# Patient Record
Sex: MALE | Race: BLACK OR AFRICAN AMERICAN | NOT HISPANIC OR LATINO | Employment: FULL TIME | URBAN - METROPOLITAN AREA
[De-identification: names, ages, dates, MRNs, and addresses within clinical notes are randomized per-mention and may not be internally consistent; named-entity substitution may affect disease eponyms.]

---

## 2018-01-24 ENCOUNTER — OFFICE VISIT (OUTPATIENT)
Dept: FAMILY MEDICINE | Facility: CLINIC | Age: 48
End: 2018-01-24
Payer: COMMERCIAL

## 2018-01-24 ENCOUNTER — LAB VISIT (OUTPATIENT)
Dept: LAB | Facility: HOSPITAL | Age: 48
End: 2018-01-24
Attending: INTERNAL MEDICINE
Payer: COMMERCIAL

## 2018-01-24 VITALS
SYSTOLIC BLOOD PRESSURE: 130 MMHG | HEART RATE: 82 BPM | HEIGHT: 71 IN | DIASTOLIC BLOOD PRESSURE: 80 MMHG | WEIGHT: 214.94 LBS | TEMPERATURE: 98 F | OXYGEN SATURATION: 98 % | BODY MASS INDEX: 30.09 KG/M2

## 2018-01-24 DIAGNOSIS — E78.5 DYSLIPIDEMIA: ICD-10-CM

## 2018-01-24 LAB
ALBUMIN SERPL BCP-MCNC: 3.6 G/DL
ALP SERPL-CCNC: 91 U/L
ALT SERPL W/O P-5'-P-CCNC: 14 U/L
ANION GAP SERPL CALC-SCNC: 6 MMOL/L
AST SERPL-CCNC: 11 U/L
BILIRUB SERPL-MCNC: 1.3 MG/DL
BUN SERPL-MCNC: 15 MG/DL
CALCIUM SERPL-MCNC: 9.5 MG/DL
CHLORIDE SERPL-SCNC: 98 MMOL/L
CHOLEST SERPL-MCNC: 189 MG/DL
CHOLEST/HDLC SERPL: 4.1 {RATIO}
CO2 SERPL-SCNC: 30 MMOL/L
CREAT SERPL-MCNC: 1.1 MG/DL
EST. GFR  (AFRICAN AMERICAN): >60 ML/MIN/1.73 M^2
EST. GFR  (NON AFRICAN AMERICAN): >60 ML/MIN/1.73 M^2
ESTIMATED AVG GLUCOSE: 326 MG/DL
GLUCOSE SERPL-MCNC: 320 MG/DL
HBA1C MFR BLD HPLC: 13 %
HDLC SERPL-MCNC: 46 MG/DL
HDLC SERPL: 24.3 %
LDLC SERPL CALC-MCNC: 130.4 MG/DL
NONHDLC SERPL-MCNC: 143 MG/DL
POTASSIUM SERPL-SCNC: 4.3 MMOL/L
PROT SERPL-MCNC: 7.9 G/DL
SODIUM SERPL-SCNC: 134 MMOL/L
TRIGL SERPL-MCNC: 63 MG/DL
TSH SERPL DL<=0.005 MIU/L-ACNC: 0.75 UIU/ML

## 2018-01-24 PROCEDURE — 99999 PR PBB SHADOW E&M-EST. PATIENT-LVL IV: CPT | Mod: PBBFAC,,, | Performed by: INTERNAL MEDICINE

## 2018-01-24 PROCEDURE — 80061 LIPID PANEL: CPT

## 2018-01-24 PROCEDURE — 99214 OFFICE O/P EST MOD 30 MIN: CPT | Mod: S$GLB,,, | Performed by: INTERNAL MEDICINE

## 2018-01-24 PROCEDURE — 36415 COLL VENOUS BLD VENIPUNCTURE: CPT | Mod: PO

## 2018-01-24 PROCEDURE — 83036 HEMOGLOBIN GLYCOSYLATED A1C: CPT

## 2018-01-24 PROCEDURE — 80053 COMPREHEN METABOLIC PANEL: CPT

## 2018-01-24 PROCEDURE — 84443 ASSAY THYROID STIM HORMONE: CPT

## 2018-01-24 RX ORDER — ATORVASTATIN CALCIUM 20 MG/1
20 TABLET, FILM COATED ORAL DAILY
Qty: 90 TABLET | Refills: 3 | Status: SHIPPED | OUTPATIENT
Start: 2018-01-24 | End: 2019-01-24

## 2018-01-24 RX ORDER — GLIMEPIRIDE 4 MG/1
4 TABLET ORAL 2 TIMES DAILY
Qty: 180 TABLET | Refills: 3 | Status: SHIPPED | OUTPATIENT
Start: 2018-01-24

## 2018-01-24 RX ORDER — INSULIN GLARGINE 100 [IU]/ML
50 INJECTION, SOLUTION SUBCUTANEOUS DAILY
Qty: 3 ML | Refills: 11 | Status: SHIPPED | OUTPATIENT
Start: 2018-01-24 | End: 2019-01-24

## 2018-01-24 RX ORDER — INSULIN LISPRO 100 [IU]/ML
14 INJECTION, SOLUTION INTRAVENOUS; SUBCUTANEOUS
Qty: 3 ML | Refills: 11 | Status: SHIPPED | OUTPATIENT
Start: 2018-01-24 | End: 2018-01-31 | Stop reason: SDUPTHER

## 2018-01-24 NOTE — PATIENT INSTRUCTIONS
I AM TRYING YOU ON HUMALOG INSTEAD OF NOVOLOG BECAUSE I THINK IT WILL BE CHEAPER.    I AM STARTING YOU ON BASAGLAR - THIS IS LONG ACTING INSULIN.    I SENT BOTH OF THESE IN TO THE [facility] SO THEY CAN GET APPROVAL.  IN THE FUTURE I CAN SEND THIS IN TO THE [facility].    I REFILLED THE AMARYL - KEEP TAKING THAT.    PLEASE WRITE DOWN ALL YOUR SUGARS IN A BOOK AND BRING IT WITH YOU TO EVERY APPOINTMENT.    TAKE THE ATORVASTATIN FOR CHOLESTEROL.

## 2018-01-24 NOTE — PROGRESS NOTES
This note was created by combination of typed  and Dragon dictation.  Transcription errors may be present.  If there are any questions, please contact me.    Assessment & Plan  Uncontrolled type 2 diabetes mellitus without complication, with long-term current use of insulin - with a history of poor compliance.  Notes the insulin can be expensive.  I'll try him with Humalog instead of NovoLog as I think his formulary prefers Humalog; I'll try him with Basaglar instead of Toujeo.  Encouraged him to check his sugars regularly and bring in log of his readings to every visit regardless of the purpose of the visit.  Foot exam normal today.  Notes that he is due for an eye exam, does not recall the name of his eye doctor and I have asked him to have his eye doctor send me a copy of his report when he gets it done.  I've asked him to bring in his pen to work and administer short acting insulin with every meal.  An I've asked him to restart long-acting insulin.  I've refilled his glimepiride 4 mg twice daily.  Fasting today, check lipid profile, metabolic panel, A1c.  I expect the A1c to be high.  I sent in the insulin to [facility] for prior authorization approval.  -     insulin lispro (HUMALOG) 100 unit/mL injection; Inject 14 Units into the skin 3 (three) times daily before meals.  Dispense: 3 mL; Refill: 11  -     insulin glargine (BASAGLAR KWIKPEN) 100 unit/mL (3 mL) InPn pen; Inject 50 Units into the skin once daily.  Dispense: 3 mL; Refill: 11  -     glimepiride (AMARYL) 4 MG tablet; Take 1 tablet (4 mg total) by mouth 2 (two) times daily.  Dispense: 180 tablet; Refill: 3  -     Comprehensive metabolic panel; Future; Expected date: 01/24/2018  -     Lipid panel; Future; Expected date: 01/24/2018  -     Hemoglobin A1c; Future; Expected date: 01/24/2018  -     TSH; Future; Expected date: 01/24/2018    Dyslipidemia-looks like previously on atorvastatin though he can't recall this medication.  I  have sent the medication in and instructed him to take it regularly.  -     atorvastatin (LIPITOR) 20 MG tablet; Take 1 tablet (20 mg total) by mouth once daily.  Dispense: 90 tablet; Refill: 3    Erectile disorder-address next visit    Medications Discontinued During This Encounter   Medication Reason    insulin aspart (NOVOLOG) 100 unit/mL Crtg Cost of medication    insulin glargine, TOUJEO, (TOUJEO SOLOSTAR) 300 unit/mL (1.5 mL) InPn pen Cost of medication    glimepiride (AMARYL) 4 MG tablet Reorder       Follow-up: No Follow-up on file. follow-up 1 month, I've asked him to bring in his blood sugar readings with him at that visit.      =================================================================      Chief Complaint   Patient presents with    hospitals Care    Diabetes       GUERO  Teresa is a 48 y.o. male, last appointment with this clinic was Visit date not found.    Former pt of Dr. Dupont.  Originally scheduled for 8:20 appt, showed up at 10 AM.  I had another opening and was able to accomodate him.  First visit with me.    Diabetes type 2, on insulin; hx of liraglutide.  Hx of poor compliance.    Hyperlipidemia hx of atorvastatin though he has no recollection of such.   Microcytosis.  Normal RDW.  Normal white count and platelet count.  Had previously complained of flank pain and the plan was to order ultrasound, does not appear this was ever done.     Last labs on record November 2016, A1c at that time 10.5, LDL at that time 123    He is here accompanied by his wife.  On review of his medication list he is only taking the NovoLog and only taking it once a day.  Does note that it is expensive.  He takes 14 units just once a day.  He was not aware that he could bring the pen unrefrigerated to work and administer with meals.  I've asked him to try to do so.  In regards to the long-acting insulin he is not taking that.  Does not check his sugars every day.  Did not bring in any glucose readings.  Did  not check his sugars today but recalls yesterday fasting sugar was 273.  He was previously prescribed NovoLog and Toujeo.  I believe on his insurance formulary, Basaglar would be less expensive and that it prefers humalog to novolog.  So will try that.   Eye doctor on [facility].   No self foot exam.  No chest pain no dyspnea. No tingling in the extremities.  Notes issues with erectile dysfunction.    Patient Care Team:  Simba Riojas MD as PCP - General (Internal Medicine)    Patient Active Problem List    Diagnosis Date Noted    Type 2 diabetes mellitus, uncontrolled 01/13/2016    BMI 32.0-32.9,adult 01/13/2016    Dyslipidemia 07/29/2015    Obesity 12/26/2012       PAST MEDICAL HISTORY:  Past Medical History:   Diagnosis Date    GERD (gastroesophageal reflux disease)     Insomnia     Palpitations     Type II or unspecified type diabetes mellitus without mention of complication, not stated as uncontrolled        PAST SURGICAL HISTORY:  History reviewed. No pertinent surgical history.    SOCIAL HISTORY:  Social History     Social History    Marital status:      Spouse name: N/A    Number of children: N/A    Years of education: N/A     Occupational History    floor care, day shift [employer]     Social History Main Topics    Smoking status: Never Smoker    Smokeless tobacco: Never Used    Alcohol use No    Drug use: No    Sexual activity: Yes     Partners: Female     Other Topics Concern    Not on file     Social History Narrative    No narrative on file       ALLERGIES AND MEDICATIONS: updated and reviewed.  Review of patient's allergies indicates:  No Known Allergies  Current Outpatient Prescriptions   Medication Sig Dispense Refill    blood sugar diagnostic (FREESTYLE LITE STRIPS) Strp 1 each by Misc.(Non-Drug; Combo Route) route 3 (three) times daily. 100 each 5    glimepiride (AMARYL) 4 MG tablet Take 1 tablet (4 mg total) by mouth 2 (two) times daily. 180 tablet 3    insulin  "aspart (NOVOLOG) 100 unit/mL Crtg Inject 15 Units into the skin 3 (three) times daily with meals. and dinner 5 cartridge 6    insulin glargine, TOUJEO, (TOUJEO SOLOSTAR) 300 unit/mL (1.5 mL) InPn pen Inject 50 Units into the skin every evening. 5 Syringe 6     No current facility-administered medications for this visit.        Review of Systems   Constitutional: Negative for chills, fever, malaise/fatigue and weight loss.   HENT: Negative for congestion.    Eyes: Negative for blurred vision and pain.   Respiratory: Negative for shortness of breath and wheezing.    Cardiovascular: Negative for chest pain, palpitations and leg swelling.   Gastrointestinal: Negative for abdominal pain.   Genitourinary: Negative for dysuria.   Neurological: Negative for tingling, focal weakness, weakness and headaches.       Physical Exam  Vitals:    01/24/18 1011 01/24/18 1037   BP: (!) 144/90 130/80   BP Location:  Left arm   Patient Position:  Sitting   BP Method:  Large (Manual)   Pulse: 82    Temp: 98.2 °F (36.8 °C)    SpO2: 98%    Weight: 97.5 kg (214 lb 15.2 oz)    Height: 5' 11" (1.803 m)     Body mass index is 29.98 kg/m².  Weight: 97.5 kg (214 lb 15.2 oz)   Height: 5' 11" (180.3 cm)     Physical Exam   Constitutional: He is oriented to person, place, and time. He appears well-developed and well-nourished. No distress.   Eyes: EOM are normal.   Cardiovascular: Normal rate, regular rhythm and normal heart sounds.    No murmur heard.  Pulses:       Dorsalis pedis pulses are 3+ on the right side, and 0 on the left side.        Posterior tibial pulses are 0 on the right side, and 3+ on the left side.   Pulmonary/Chest: Effort normal and breath sounds normal.   Musculoskeletal: Normal range of motion.        Right foot: There is no deformity.        Left foot: There is no deformity.   Feet:   Right Foot:   Protective Sensation: 5 sites tested. 5 sites sensed.   Skin Integrity: Negative for ulcer, blister, skin breakdown, erythema " or warmth.   Left Foot:   Protective Sensation: 5 sites tested. 5 sites sensed.   Skin Integrity: Negative for ulcer, blister, skin breakdown, erythema or warmth.   Neurological: He is alert and oriented to person, place, and time. Coordination normal.   Skin: Skin is warm and dry.   Psychiatric: He has a normal mood and affect. His behavior is normal. Thought content normal.

## 2018-01-25 NOTE — PROGRESS NOTES
a1c high expected.  Was not taking insulin as prescribed.  Discussed with him at OV.  Changed the insulin rx.  Lipid high not on statin at time of visit.    TSH WNL  Na low and creatinine above baseline.  Suspect due to hyperglycemia at time of labs, will need to monitor with (hopefully) better glucose control.

## 2018-01-26 DIAGNOSIS — Z13.5 DIABETIC RETINOPATHY SCREENING: ICD-10-CM

## 2018-01-31 ENCOUNTER — TELEPHONE (OUTPATIENT)
Dept: FAMILY MEDICINE | Facility: CLINIC | Age: 48
End: 2018-01-31

## 2018-01-31 RX ORDER — INSULIN LISPRO 100 [IU]/ML
14 INJECTION, SOLUTION INTRAVENOUS; SUBCUTANEOUS
Qty: 3 ML | Refills: 11 | Status: SHIPPED | OUTPATIENT
Start: 2018-01-31 | End: 2019-01-31

## 2018-01-31 NOTE — TELEPHONE ENCOUNTER
----- Message from Mary Escobar sent at 1/26/2018 12:41 PM CST -----  Contact: Self/[phone]  Patient would like to speak the staff about his prescription:  insulin lispro (HUMALOG) 100 unit/mL injection. Thank you.

## 2021-01-14 ENCOUNTER — CLINICAL SUPPORT (OUTPATIENT)
Dept: URGENT CARE | Facility: CLINIC | Age: 51
End: 2021-01-14
Payer: COMMERCIAL

## 2021-01-14 DIAGNOSIS — U07.1 COVID-19: Primary | ICD-10-CM

## 2021-01-14 DIAGNOSIS — U07.1 COVID-19 VIRUS DETECTED: ICD-10-CM

## 2021-01-14 LAB
CTP QC/QA: YES
SARS-COV-2 RDRP RESP QL NAA+PROBE: POSITIVE

## 2021-01-14 PROCEDURE — U0002 COVID-19 LAB TEST NON-CDC: HCPCS | Mod: QW,S$GLB,, | Performed by: PHYSICIAN ASSISTANT

## 2021-01-14 PROCEDURE — U0002: ICD-10-PCS | Mod: QW,S$GLB,, | Performed by: PHYSICIAN ASSISTANT

## 2022-09-22 ENCOUNTER — HOSPITAL ENCOUNTER (EMERGENCY)
Facility: HOSPITAL | Age: 52
Discharge: HOME | End: 2022-09-22
Attending: EMERGENCY MEDICINE
Payer: COMMERCIAL

## 2022-09-22 VITALS
WEIGHT: 216 LBS | BODY MASS INDEX: 30.24 KG/M2 | RESPIRATION RATE: 16 BRPM | HEIGHT: 71 IN | DIASTOLIC BLOOD PRESSURE: 75 MMHG | OXYGEN SATURATION: 99 % | HEART RATE: 72 BPM | TEMPERATURE: 98 F | SYSTOLIC BLOOD PRESSURE: 131 MMHG

## 2022-09-22 DIAGNOSIS — M54.32 LEFT SIDED SCIATICA: Primary | ICD-10-CM

## 2022-09-22 LAB — POCT GLUCOSE: 170 MG/DL (ref 70–110)

## 2022-09-22 PROCEDURE — 82962 GLUCOSE BLOOD TEST: CPT | Mod: ER

## 2022-09-22 PROCEDURE — 99284 EMERGENCY DEPT VISIT MOD MDM: CPT | Mod: 25,ER

## 2022-09-22 PROCEDURE — 96372 THER/PROPH/DIAG INJ SC/IM: CPT | Performed by: NURSE PRACTITIONER

## 2022-09-22 PROCEDURE — 63600175 PHARM REV CODE 636 W HCPCS: Mod: ER | Performed by: NURSE PRACTITIONER

## 2022-09-22 RX ORDER — SULINDAC 150 MG/1
150 TABLET ORAL 2 TIMES DAILY
Qty: 10 TABLET | Refills: 0 | Status: SHIPPED | OUTPATIENT
Start: 2022-09-22 | End: 2022-09-27

## 2022-09-22 RX ORDER — CYCLOBENZAPRINE HCL 10 MG
10 TABLET ORAL 3 TIMES DAILY PRN
Qty: 15 TABLET | Refills: 0 | Status: SHIPPED | OUTPATIENT
Start: 2022-09-22 | End: 2022-09-27

## 2022-09-22 RX ORDER — KETOROLAC TROMETHAMINE 30 MG/ML
15 INJECTION, SOLUTION INTRAMUSCULAR; INTRAVENOUS
Status: COMPLETED | OUTPATIENT
Start: 2022-09-22 | End: 2022-09-22

## 2022-09-22 RX ADMIN — KETOROLAC TROMETHAMINE 15 MG: 30 INJECTION, SOLUTION INTRAMUSCULAR; INTRAVENOUS at 07:09

## 2022-09-22 NOTE — Clinical Note
"Teresa Guallpa" Giuseppe was seen and treated in our emergency department on 9/22/2022.  He may return to work on 09/24/2022.       If you have any questions or concerns, please don't hesitate to call.       RN    "

## 2022-09-22 NOTE — Clinical Note
"Teresa Machadofreddy Chin was seen and treated in our emergency department on 9/22/2022.  He may return to work on 09/26/2022.       If you have any questions or concerns, please don't hesitate to call.      Edil Bueno, CHRISTOPHER"
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
## 2022-09-23 NOTE — ED PROVIDER NOTES
Encounter Date: 9/22/2022    SCRIBE #1 NOTE: I, Miryam Crawford, am scribing for, and in the presence of,  Edil Bueno DNP. I have scribed the following portions of the note - Other sections scribed: HPI, ROS, PE.     History     Chief Complaint   Patient presents with    Back Pain     Pt has lower back pain that radiates down his left leg      52 y.o. male with Hx of DM who presents to the ER for acute left lower back pain radiating down his left leg since mowing the lawn yesterday. He has taken Aleeve with some alleviation of symptoms. Denies any fall or injury. Denies numbness or tingling. Patient reports seeing a chiropractor.     The history is provided by the patient. No  was used.   Review of patient's allergies indicates:  No Known Allergies  Past Medical History:   Diagnosis Date    GERD (gastroesophageal reflux disease)     Insomnia     Palpitations     Type II or unspecified type diabetes mellitus without mention of complication, not stated as uncontrolled      No past surgical history on file.  Family History   Problem Relation Age of Onset    No Known Problems Mother     No Known Problems Sister     No Known Problems Brother     No Known Problems Son     No Known Problems Daughter      Social History     Tobacco Use    Smoking status: Never    Smokeless tobacco: Never   Substance Use Topics    Alcohol use: No    Drug use: No     Review of Systems   Constitutional:  Negative for chills, fatigue and fever.   HENT:  Negative for congestion, ear discharge, ear pain, postnasal drip, rhinorrhea, sinus pressure, sneezing, sore throat and voice change.    Eyes:  Negative for discharge and itching.   Respiratory:  Negative for cough, shortness of breath and wheezing.    Cardiovascular:  Negative for chest pain, palpitations and leg swelling.   Gastrointestinal:  Negative for abdominal pain, constipation, diarrhea, nausea and vomiting.   Endocrine: Negative for polydipsia, polyphagia and  polyuria.   Genitourinary:  Negative for dysuria, frequency, hematuria and urgency.   Musculoskeletal:  Positive for back pain and myalgias (L leg). Negative for arthralgias.   Skin:  Negative for rash and wound.   Neurological:  Negative for dizziness, seizures, syncope, weakness and numbness.        (-) Tingling.   Hematological:  Negative for adenopathy. Does not bruise/bleed easily.   Psychiatric/Behavioral:  Negative for self-injury and suicidal ideas. The patient is not nervous/anxious.      Physical Exam     Initial Vitals [09/22/22 1804]   BP Pulse Resp Temp SpO2   (!) 150/80 80 18 98.7 °F (37.1 °C) 97 %      MAP       --         Physical Exam    Nursing note and vitals reviewed.  Constitutional: He appears well-developed and well-nourished. He is not diaphoretic. No distress.   HENT:   Head: Normocephalic and atraumatic.   Right Ear: External ear normal.   Left Ear: External ear normal.   Nose: Nose normal.   Eyes: Pupils are equal, round, and reactive to light. Right eye exhibits no discharge. Left eye exhibits no discharge. No scleral icterus.   Neck:   Normal range of motion.  Pulmonary/Chest: No respiratory distress.   Abdominal: He exhibits no distension.   Musculoskeletal:      Cervical back: Normal range of motion.      Lumbar back: Positive left straight leg raise test.      Comments: No spinal tenderness or step-offs.      Neurological: He is alert and oriented to person, place, and time.   Skin: Skin is dry. Capillary refill takes less than 2 seconds.       ED Course   Procedures  Labs Reviewed   POCT GLUCOSE - Abnormal; Notable for the following components:       Result Value    POCT Glucose 170 (*)     All other components within normal limits   POCT GLUCOSE MONITORING CONTINUOUS          Imaging Results    None          Medications   ketorolac injection 15 mg (15 mg Intramuscular Given 9/22/22 1956)     Medical Decision Making:   History:   Old Medical Records: I decided to obtain old medical  records.  Initial Assessment:   52 y.o. male with Hx of DM who presents to the ER for acute left lower back pain radiating down his left leg since mowing the lawn yesterday. Physical exam with positive left SLR.   Differential Diagnosis:   Includes but is not limited to: Sciatica, Strain, Muscle spasm        Scribe Attestation:   Scribe #1: I performed the above scribed service and the documentation accurately describes the services I performed. I attest to the accuracy of the note.        ED Course as of 09/22/22 2144   Thu Sep 22, 2022   1930 BP(!): 150/80 [VC]   1930 Temp: 98.7 °F (37.1 °C) [VC]   1930 Pulse: 80 [VC]   1930 Resp: 18 [VC]   1930 SpO2: 97 % [VC]   2002 POCT Glucose(!): 170 [VC]      ED Course User Index  [VC] Edil Bueno DNP               Scribmichael attestation: Scribe attestation: I, Edil Bueno DNP ACNP-BC FNP-C ENP-C,  personally performed the services described in this documentation. All medical record entries made by the scribe were at my direction and in my presence.  I have reviewed the chart and agree that the record reflects my personal performance and is accurate and complete.   Clinical Impression:   Final diagnoses:  [M54.32] Left sided sciatica (Primary)      ED Disposition Condition    Discharge Stable        See AVS for additional recommendations. Medications listed herein were prescribed after reviewing the patient's allergies, medication list, history, most recent laboratories as available.  Referrals below were provided after reviewing the patient's previous medical providers. He understands he  should return for any worsening or changes in condition.  Prior to discharge the patient was asked if he  had any additional concerns or complaints and he declined. The patient was given an opportunity to ask questions and all were answered to his satisfaction.  ED Prescriptions       Medication Sig Dispense Start Date End Date Auth. Provider    sulindac (CLINORIL) 150 MG  tablet Take 1 tablet (150 mg total) by mouth 2 (two) times daily. for 5 days 10 tablet 9/22/2022 9/27/2022 Edil Bueno DNP    cyclobenzaprine (FLEXERIL) 10 MG tablet Take 1 tablet (10 mg total) by mouth 3 (three) times daily as needed for Muscle spasms. 15 tablet 9/22/2022 9/27/2022 Edil Bueno DNP          Follow-up Information       Follow up With Specialties Details Why Contact Info    Simba Riojas MD Internal Medicine Schedule an appointment as soon as possible for a visit   [address] [facility]  [place] [id number]  [phone]               Edil Bueno DNP  09/22/22 3680

## 2022-09-24 ENCOUNTER — HOSPITAL ENCOUNTER (EMERGENCY)
Facility: HOSPITAL | Age: 52
Discharge: HOME | End: 2022-09-24
Attending: EMERGENCY MEDICINE
Payer: COMMERCIAL

## 2022-09-24 VITALS
BODY MASS INDEX: 30.1 KG/M2 | WEIGHT: 215 LBS | HEIGHT: 71 IN | OXYGEN SATURATION: 99 % | TEMPERATURE: 98 F | RESPIRATION RATE: 18 BRPM | HEART RATE: 78 BPM | SYSTOLIC BLOOD PRESSURE: 138 MMHG | DIASTOLIC BLOOD PRESSURE: 77 MMHG

## 2022-09-24 DIAGNOSIS — M54.42 ACUTE MIDLINE LOW BACK PAIN WITH LEFT-SIDED SCIATICA: Primary | ICD-10-CM

## 2022-09-24 PROCEDURE — 63600175 PHARM REV CODE 636 W HCPCS

## 2022-09-24 PROCEDURE — 99284 EMERGENCY DEPT VISIT MOD MDM: CPT

## 2022-09-24 PROCEDURE — 96372 THER/PROPH/DIAG INJ SC/IM: CPT

## 2022-09-24 PROCEDURE — 25000003 PHARM REV CODE 250

## 2022-09-24 RX ORDER — LIDOCAINE 50 MG/G
1 PATCH TOPICAL DAILY
Qty: 15 PATCH | Refills: 0 | Status: SHIPPED | OUTPATIENT
Start: 2022-09-24

## 2022-09-24 RX ORDER — KETOROLAC TROMETHAMINE 30 MG/ML
30 INJECTION, SOLUTION INTRAMUSCULAR; INTRAVENOUS
Status: COMPLETED | OUTPATIENT
Start: 2022-09-24 | End: 2022-09-24

## 2022-09-24 RX ORDER — LIDOCAINE 50 MG/G
1 PATCH TOPICAL
Status: DISCONTINUED | OUTPATIENT
Start: 2022-09-24 | End: 2022-09-24 | Stop reason: HOSPADM

## 2022-09-24 RX ORDER — ORPHENADRINE CITRATE 100 MG/1
100 TABLET, EXTENDED RELEASE ORAL 2 TIMES DAILY PRN
Qty: 20 TABLET | Refills: 0 | Status: SHIPPED | OUTPATIENT
Start: 2022-09-24 | End: 2022-10-04

## 2022-09-24 RX ORDER — ORPHENADRINE CITRATE 30 MG/ML
30 INJECTION INTRAMUSCULAR; INTRAVENOUS
Status: COMPLETED | OUTPATIENT
Start: 2022-09-24 | End: 2022-09-24

## 2022-09-24 RX ORDER — HYDROCODONE BITARTRATE AND ACETAMINOPHEN 5; 325 MG/1; MG/1
1 TABLET ORAL EVERY 4 HOURS PRN
Qty: 9 TABLET | Refills: 0 | Status: SHIPPED | OUTPATIENT
Start: 2022-09-24

## 2022-09-24 RX ADMIN — KETOROLAC TROMETHAMINE 30 MG: 30 INJECTION, SOLUTION INTRAMUSCULAR at 03:09

## 2022-09-24 RX ADMIN — LIDOCAINE 1 PATCH: 50 PATCH TOPICAL at 03:09

## 2022-09-24 RX ADMIN — ORPHENADRINE CITRATE 30 MG: 30 INJECTION INTRAMUSCULAR; INTRAVENOUS at 03:09

## 2022-09-24 NOTE — ED PROVIDER NOTES
"Encounter Date: 9/24/2022    SCRIBE #1 NOTE: I, SAKINA GEORGE, am scribing for, and in the presence of,  Alayna Holdsworth, PA-C. I have scribed the following portions of the note - Other sections scribed: HPI, ROS.     History     Chief Complaint   Patient presents with    Back Pain     Pt reports to the ED with C/O lumbar back pain x4 days. Pt reports "I was seen for this on Thursday and given a shot but it still hurts." Pt denies any changes to bowel or bladder function. Pt awaiting QT bed for eval.      52-year-old male patient with a past medical history of DM Type 2, presents to the ED with a chief complaint of lower back pain for three days. Patient reports that he was previously seen at this ED facility on September 22, 2022 for the same complaint, and was administered an injection and prescribed pain medications and muscle relaxants with no resolution. Patient states that he is still having intermittent, sharp "ten out of ten" lower back pain that radiates down his left leg and exacerbates with movement. He denies taking any other medications to alleviate pain, and further denies any recent traumas to the area. No other exacerbating or alleviating factors. Denies fever, diaphoresis, chills, SOB, CP, nausea, emesis, urinary abnormalities, neck pain, rashes, numbness, tingling, rhinorrhea, congestion, abdominal pain, diarrhea, constipation, headaches, or other associated symptoms. No IVDU.     The history is provided by the patient. No  was used.   Review of patient's allergies indicates:  No Known Allergies  Past Medical History:   Diagnosis Date    GERD (gastroesophageal reflux disease)     Insomnia     Palpitations     Type II or unspecified type diabetes mellitus without mention of complication, not stated as uncontrolled      No past surgical history on file.  Family History   Problem Relation Age of Onset    No Known Problems Mother     No Known Problems Sister     No Known Problems " Brother     No Known Problems Son     No Known Problems Daughter      Social History     Tobacco Use    Smoking status: Never    Smokeless tobacco: Never   Substance Use Topics    Alcohol use: No    Drug use: No     Review of Systems   Constitutional:  Negative for appetite change, chills, diaphoresis, fatigue and fever.   HENT:  Negative for congestion, ear discharge, ear pain, postnasal drip, rhinorrhea, sinus pressure, sneezing, sore throat and voice change.    Eyes:  Negative for discharge, itching and visual disturbance.   Respiratory:  Negative for cough, shortness of breath and wheezing.    Cardiovascular:  Negative for chest pain, palpitations and leg swelling.   Gastrointestinal:  Negative for abdominal pain, diarrhea, nausea and vomiting.   Endocrine: Negative for polydipsia, polyphagia and polyuria.   Genitourinary:  Negative for difficulty urinating, dysuria, frequency, hematuria, penile discharge, penile pain, penile swelling and urgency.   Musculoskeletal:  Positive for back pain (lower). Negative for arthralgias and myalgias.   Skin:  Negative for rash and wound.   Neurological:  Negative for dizziness, seizures, syncope, weakness, numbness and headaches.   Hematological:  Negative for adenopathy. Does not bruise/bleed easily.   Psychiatric/Behavioral:  Negative for agitation and self-injury. The patient is not nervous/anxious.      Physical Exam     Initial Vitals [09/24/22 1427]   BP Pulse Resp Temp SpO2   (!) 142/85 89 18 98.6 °F (37 °C) 100 %      MAP       --         Physical Exam    Nursing note and vitals reviewed.  Constitutional: Vital signs are normal. He appears well-developed and well-nourished. He is not diaphoretic. He is active. He does not appear ill. No distress.   HENT:   Head: Normocephalic and atraumatic.   Right Ear: External ear normal.   Left Ear: External ear normal.   Nose: Nose normal.   Eyes: Conjunctivae and lids are normal. Pupils are equal, round, and reactive to light.  Right eye exhibits no discharge. Left eye exhibits no discharge. No scleral icterus.   Neck: Neck supple.   Normal range of motion.   Full passive range of motion without pain.     Cardiovascular:  Normal rate and regular rhythm.           Pulmonary/Chest: Effort normal and breath sounds normal. No respiratory distress.   Abdominal: He exhibits no distension.   Musculoskeletal:      Cervical back: Normal, full passive range of motion without pain, normal range of motion and neck supple. No spinous process tenderness.      Thoracic back: Normal.      Lumbar back: Tenderness (Paraspinal tenderness) present. No swelling, edema, deformity, lacerations or bony tenderness. Positive left straight leg raise test.     Neurological: He is alert and oriented to person, place, and time. He has normal strength. No sensory deficit. GCS eye subscore is 4. GCS verbal subscore is 5. GCS motor subscore is 6.   Skin: Skin is dry. Capillary refill takes less than 2 seconds.       ED Course   Procedures  Labs Reviewed - No data to display       Imaging Results    None          Medications   LIDOcaine 5 % patch 1 patch (1 patch Transdermal Patch Applied 9/24/22 1536)   orphenadrine injection 30 mg (30 mg Intramuscular Given 9/24/22 1535)   ketorolac injection 30 mg (30 mg Intramuscular Given 9/24/22 1534)     Medical Decision Making:   History:   Old Medical Records: I decided to obtain old medical records.  Initial Assessment:   52-year-old male patient with a past medical history of DM Type 2, presents to the ED with a chief complaint of lower back pain.  Patient's chart and medical history reviewed.  Differential Diagnosis:   Lumbar strain  Lumbar fracture  Herniated disc  Cauda Equina  Spinal Abscess  Sciatica   ED Management:  Patient's vitals reviewed.  He is afebrile, no respiratory distress, nontoxic-appearing in the ED. Patient had lower paraspinal tenderness with a positive left straight leg raise test.  Patient given a  lidocaine patch, Toradol, and Norflex injection for pain relief.  Discussed with patient he has left-sided sciatica.  Considered but unlikely cauda equina syndrome due to ELEANOR, no saddle antesthesia, bowel incontinence, urinary retention, or numbness and tingling.  Considered but unlikely a spinal abscess due to no history of IVDU, fevers, fluctuance, neuro deficits, or weakness. Patient will be sent home with Norflex, lidocaine patches and a short course of Norco for added relief.  Patient will be sent a referral to Neurosurgery for his sciatica. Patient agrees with this plan. Discussed with him strict return precautions, he verbalized understanding. Patient is stable for discharge.           Scribe Attestation:   Scribe #1: I performed the above scribed service and the documentation accurately describes the services I performed. I attest to the accuracy of the note.                   Clinical Impression:   Final diagnoses:  [M54.42] Acute midline low back pain with left-sided sciatica (Primary)   Scribe attestation: I, Alayna Holdsworth,PA-C, personally performed the services described in this documentation. All medical record entries made by the scribe were at my direction and in my presence.  I have reviewed the chart and agree that the record reflects my personal performance and is accurate and complete.    ED Disposition Condition    Discharge Stable          ED Prescriptions       Medication Sig Dispense Start Date End Date Auth. Provider    LIDOcaine (LIDODERM) 5 % Place 1 patch onto the skin once daily. Remove & Discard patch within 12 hours then leave off for 12 hours 15 patch 9/24/2022 -- Alayna Holdsworth, PA-C    orphenadrine (NORFLEX) 100 mg tablet Take 1 tablet (100 mg total) by mouth 2 (two) times daily as needed for Muscle spasms or Pain. 20 tablet 9/24/2022 10/4/2022 Alayna Holdsworth, PA-C    HYDROcodone-acetaminophen (NORCO) 5-325 mg per tablet Take 1 tablet by mouth every 4 (four) hours as needed  for Pain. 9 tablet 9/24/2022 -- Alayna Holdsworth, PA-C          Follow-up Information       Follow up With Specialties Details Why Contact Info    Elysia Flynn PA-C Neurosurgery   [address]  [address]  [facility] [address]  [phone]               Alayna Holdsworth, PA-C  09/24/22 2205

## 2022-09-24 NOTE — DISCHARGE INSTRUCTIONS
Thank you for coming to our Emergency Department today. It is important to remember that some problems are difficult to diagnose and may not be found during your first visit. Be sure to follow up with your primary care doctor and review any labs/imaging that was performed with them. If you do not have a primary care doctor, you may contact the one listed on your discharge paperwork or you may also call the [facility] Appointment Desk at [phone] to schedule an appointment with one.     All medications may potentially have side effects and it is impossible to predict which medications may give you side effects. If you feel that you are having a negative effect of any medication you should immediately stop taking them and seek medical attention.    Return to the ER with any questions/concerns, new/concerning symptoms, worsening or failure to improve. Do not drive or make any important decisions for 24 hours if you have received any pain medications, sedatives or mood altering drugs during your ER visit.

## 2022-09-24 NOTE — ED TRIAGE NOTES
Pt. Complains of lumbar pain that started 4 days ago. Pt. Was seen here on Thursday with the same complaint, but he states that the pain is not any better.

## 2022-09-26 ENCOUNTER — TELEPHONE (OUTPATIENT)
Dept: NEUROSURGERY | Facility: CLINIC | Age: 52
End: 2022-09-26
Payer: COMMERCIAL
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
## 2023-01-18 DIAGNOSIS — Z11.59 NEED FOR HEPATITIS C SCREENING TEST: ICD-10-CM

## 2023-01-23 ENCOUNTER — PATIENT OUTREACH (OUTPATIENT)
Dept: ADMINISTRATIVE | Facility: HOSPITAL | Age: 53
End: 2023-01-23
Payer: COMMERCIAL